# Patient Record
Sex: FEMALE | Race: WHITE | Employment: OTHER | URBAN - METROPOLITAN AREA
[De-identification: names, ages, dates, MRNs, and addresses within clinical notes are randomized per-mention and may not be internally consistent; named-entity substitution may affect disease eponyms.]

---

## 2018-08-04 ENCOUNTER — APPOINTMENT (OUTPATIENT)
Dept: CT IMAGING | Age: 58
End: 2018-08-04
Payer: COMMERCIAL

## 2018-08-04 ENCOUNTER — HOSPITAL ENCOUNTER (EMERGENCY)
Age: 58
Discharge: HOME | End: 2018-08-04
Attending: EMERGENCY MEDICINE
Payer: COMMERCIAL

## 2018-08-04 VITALS
DIASTOLIC BLOOD PRESSURE: 82 MMHG | BODY MASS INDEX: 21.66 KG/M2 | WEIGHT: 138 LBS | TEMPERATURE: 98.1 F | HEART RATE: 57 BPM | OXYGEN SATURATION: 100 % | SYSTOLIC BLOOD PRESSURE: 140 MMHG | RESPIRATION RATE: 12 BRPM | HEIGHT: 67 IN

## 2018-08-04 DIAGNOSIS — S32.010A CLOSED COMPRESSION FRACTURE OF FIRST LUMBAR VERTEBRA, INITIAL ENCOUNTER: Primary | ICD-10-CM

## 2018-08-04 PROCEDURE — 99283 EMERGENCY DEPT VISIT LOW MDM: CPT

## 2018-08-04 PROCEDURE — 72131 CT LUMBAR SPINE W/O DYE: CPT

## 2018-08-04 RX ORDER — ACETAMINOPHEN AND CODEINE PHOSPHATE 300; 30 MG/1; MG/1
1 TABLET ORAL EVERY 4 HOURS PRN
Qty: 12 TABLET | Refills: 0 | Status: SHIPPED | OUTPATIENT
Start: 2018-08-04 | End: 2018-08-07

## 2018-08-04 ASSESSMENT — ENCOUNTER SYMPTOMS
SORE THROAT: 0
EYE PAIN: 0
COUGH: 0
STRIDOR: 0
CONSTIPATION: 0
EYE REDNESS: 0
NAUSEA: 0
BACK PAIN: 1
DIARRHEA: 0
VOMITING: 0
WHEEZING: 0
SHORTNESS OF BREATH: 0
ABDOMINAL PAIN: 0
COLOR CHANGE: 0
EYE DISCHARGE: 0

## 2018-08-04 ASSESSMENT — PAIN DESCRIPTION - ORIENTATION: ORIENTATION: LOWER

## 2018-08-04 ASSESSMENT — PAIN SCALES - GENERAL: PAINLEVEL_OUTOF10: 5

## 2018-08-04 ASSESSMENT — PAIN DESCRIPTION - LOCATION: LOCATION: BACK

## 2018-08-04 NOTE — ED PROVIDER NOTES
[facility] ED  [address] [address]  Phone: [phone]  Fax: [phone]        Pt Name: All Caballero  MRN: [id number]  [place] 1960  Date of evaluation: 8/4/18      CHIEF COMPLAINT       Chief Complaint   Patient presents with    Back Pain         HISTORY OF PRESENT ILLNESS  (Location/Symptom, Timing/Onset, Context/Setting, Quality, Duration, Modifying Factors, Severity.)    All Caballero is a 60 y.o. female who presents Complaining of lumbar pain. She relates that about 1 week ago she fell in her garage. She relates that she is not really even sure what exactly she was doing her why she fell but her foot came out from under her and slipped causing her to twist into the cabinet. She tried to grab the cabinet to keep from falling but ended up landing on the left side and relates it didn't feel quite right even when she landed but she was able to get out so she thought that everything would be fine. She relates she was doing fairly well and was even doing her yoga with minimal pain. However she relates that last night when she was coughing and sneezing she relates the pain became out of control. She denies any numbness or tingling of the extremities. She's had no problems with bowel or bladder habits. She relates when she has a bowel movement it does hurt and feels like a pressure in the low back. REVIEW OF SYSTEMS    (2-9 systems for level 4, 10 or more for level 5)     Review of Systems   Constitutional: Negative for activity change, appetite change, chills, fatigue and fever. HENT: Negative for congestion, ear pain and sore throat. Eyes: Negative for pain, discharge and redness. Respiratory: Negative for cough, shortness of breath, wheezing and stridor. Cardiovascular: Negative for chest pain. Gastrointestinal: Negative for abdominal pain, constipation, diarrhea, nausea and vomiting.    Genitourinary: Negative for decreased urine volume tenderness. Neurological: She is alert and oriented to person, place, and time. She exhibits normal muscle tone. Coordination normal.   Skin: Skin is warm. No rash noted. She is not diaphoretic. Psychiatric: She has a normal mood and affect. Her behavior is normal.       DIFFERENTIAL DIAGNOSIS/ MDM:     I did discuss with her that we will await CT imaging and go from there. There is concern that there was an abnormality on the x-ray at the urgent care she is comfortable with this plan of care verbalizes understanding. I have answered any questions has at this time. DIAGNOSTIC RESULTS     EKG: All EKG's are interpreted by the Emergency Department Physician who either signs or Co-signs this chart in the absence of a cardiologist.    None    RADIOLOGY:   Non-plain film images such as CT, Ultrasound and MRI are read by the radiologist. Gordy Gray radiographic images are visualized and the radiologist interpretations are reviewed as follows:     Interpretation per the Radiologist below, if available at the time of this note:    Ct Lumbar Spine Wo Contrast    Result Date: 8/4/2018  EXAMINATION: CT OF THE LUMBAR SPINE WITHOUT CONTRAST  8/4/2018 TECHNIQUE: CT of the lumbar spine was performed without the administration of intravenous contrast. Multiplanar reformatted images are provided for review. Dose modulation, iterative reconstruction, and/or weight based adjustment of the mA/kV was utilized to reduce the radiation dose to as low as reasonably achievable. COMPARISON: None HISTORY: ORDERING SYSTEM PROVIDED HISTORY: Pain/trauma TECHNOLOGIST PROVIDED HISTORY: Reason for exam:->Pain/trauma Ordering Physician Provided Reason for Exam: midline low back pain Acuity: Acute Type of Exam: Initial Mechanism of Injury: fall two weeks ago FINDINGS: BONES/ALIGNMENT: Diffuse osteopenia limits evaluation. Mild acute L1 compression fracture is seen with less than 25% loss of height.   No significant retropulsion of fracture

## 2018-08-04 NOTE — ED TRIAGE NOTES
Pt to exam room without difficulty with c/o low back pain. Pt was sent from urgent care. Pt fell about a week ago. Pt denies pain radiation.  Pt alert and oriented upon arrival.

## 2022-10-10 ENCOUNTER — HOSPITAL ENCOUNTER (OUTPATIENT)
Dept: CT IMAGING | Age: 62
Discharge: HOME | End: 2022-10-12
Payer: COMMERCIAL

## 2022-10-10 ENCOUNTER — OFFICE VISIT (OUTPATIENT)
Dept: ORTHOPEDIC SURGERY | Age: 62
End: 2022-10-10
Payer: COMMERCIAL

## 2022-10-10 VITALS — OXYGEN SATURATION: 100 % | RESPIRATION RATE: 16 BRPM | BODY MASS INDEX: 21.66 KG/M2 | WEIGHT: 138 LBS | HEIGHT: 67 IN

## 2022-10-10 DIAGNOSIS — S82.899A FRACTURE OF ANKLE, CLOSED, UNSPECIFIED LATERALITY, INITIAL ENCOUNTER: ICD-10-CM

## 2022-10-10 DIAGNOSIS — M25.572 LEFT ANKLE PAIN, UNSPECIFIED CHRONICITY: Primary | ICD-10-CM

## 2022-10-10 DIAGNOSIS — S82.899A FRACTURE OF ANKLE, CLOSED, UNSPECIFIED LATERALITY, INITIAL ENCOUNTER: Primary | ICD-10-CM

## 2022-10-10 PROCEDURE — 99204 OFFICE O/P NEW MOD 45 MIN: CPT | Performed by: ORTHOPAEDIC SURGERY

## 2022-10-10 PROCEDURE — 73700 CT LOWER EXTREMITY W/O DYE: CPT

## 2022-10-10 RX ORDER — ASPIRIN 325 MG
325 TABLET, DELAYED RELEASE (ENTERIC COATED) ORAL DAILY
Qty: 42 TABLET | Refills: 0 | Status: SHIPPED | OUTPATIENT
Start: 2022-10-10 | End: 2022-11-21

## 2022-10-10 NOTE — PROGRESS NOTES
[address] [facility] ORTHOPEDICS AND SPORTS MEDICINE  [address] [facility]  [address] [facility] [address]  Dept: [phone]    Ambulatory Orthopedic Consult      CHIEF COMPLAINT:    Chief Complaint   Patient presents with    Ankle Pain     Left        HISTORY OF PRESENT ILLNESS:      The patient is a 60 y.o. female who is being seen for evaluation of the above, which began around 10/4/2022 secondary to a fall from standing height  . At today's visit, she is using a splint/cast.     History is obtained today from:   [x]  the patient     [x]  EMR     []  one family member/friend    []  multiple family members/friends    []  other:          REVIEW OF SYSTEMS:  Musculoskeletal: See HPI for pertinent positives     Past Medical History:    She  has no past medical history on file. Past Surgical History:    She  has a past surgical history that includes Bunionectomy. Current Medications:   No current outpatient medications on file. Allergies:    Patient has no known allergies. Family History:  family history is not on file. Social History:   Social History     Occupational History    Not on file   Tobacco Use    Smoking status: Never    Smokeless tobacco: Never   Substance and Sexual Activity    Alcohol use: No     Alcohol/week: 0.0 standard drinks    Drug use: No    Sexual activity: Yes     Partners: Male     Retired    OBJECTIVE:  Resp 16   Ht 5' 7\" (1.702 m)   Wt 138 lb (62.6 kg)   SpO2 100%   BMI 21.61 kg/m²    Psych: awake, alert  Cardio:  well perfused extremities, no cyanosis  Resp:  normal respiratory effort  Musculoskeletal:    Affected lower extremity:    Vascular: Limb well perfused, compartments soft/compressible. Skin: No erythema/ulcers. Intact.    Neurovascular Status:  Grossly neurovascularly intact throughout  Motion:  Grossly able to fire major muscle groups with appropriate/expected AROM  Tenderness to Palpation: Ankle diffusely  -Negative squeeze test for syndesmotic injury      RADIOLOGY:   10/10/2022 FINDINGS:  Three weightbearing views (AP, Mortise, and Lateral) of the left ankle and three weightbearing views (AP, Oblique, Lateral) of the left foot were obtained in the office today and reviewed, revealing nondisplaced distal fibula fracture, as well as radiolucency through the medial malleolus likely representing nondisplaced fracture line. IMPRESSION: Osseous injury as above. Electronically signed by Loni Morrow MD      Relevant previous imaging reviewed, both imaging and report(s) as below:    No results found. ASSESSMENT AND PLAN:  Body mass index is 21.61 kg/m². She has a left ankle fracture (nondisplaced distal fibula and medial malleolus fractures), sustained around 10/4/2022. Notably, she has the past medical history as above. She has a history of left leg venous insufficiency, and tobacco use (reports that she smokes 3 cigarettes/day). We had a discussion today about the likely diagnosis and its natural history, physical exam and imaging findings, as well as various treatment options in detail. Surgically, we discussed a possible left ankle ORIF, depending on her clinical course and imaging. We discussed both surgical and nonsurgical treatment options, and I did explain that I believe nonsurgical management is very reasonable, given her lack of significant displacement, however, I did recommend a CT scan prior to making a definitive surgical decision. We discussed the risks of displacement/reinjury. Orders/referrals were placed as below at today's visit. The patient was placed into an Ace wrap and a CAM boot. We discussed that the patient will remain nonweightbearing. The patient was ordered DME to help them maintain the weightbearing restriction.  I also ordered physical therapy for the patient to hep reinforce/teach the relevant weight bearing precautions, to help allow for safe transfers and early mobilization, as well as effectively utilize DME. At today's visit, she was ordered a walker and a rolling knee scooter; we discussed that she may obtain these as needed. We also had a discussion about the risk of blood clot and thromboembolic events. The patient understands that there is an increased risk with surgery/immobilization, and understands nothing will completely eliminate the risk of DVT/PE's, and that any prophylactic medication does not substitute for early mobilization. Given her risk profile, I have recommended the following strategy to decrease the risk of blood clots, and the patient agrees and wishes to proceed:  Aspirin 325 mg PO q Day. In order to know exactly how to proceed with treatment, a CT was ordered today to evaluate the ankle fracture. This is medically necessary to evaluate the exact bony alignment/architecture. All questions were answered and the above plan was agreed upon. The patient will return to clinic after the CT has been obtained without x-rays. At her next visit, we will review her CT scan, and likely continue conservative management, versus possibly considering surgery. At the patient's next visit, depending on how the patient is doing and/or new imaging/labs results, we may consider the following options:    []  Lace up ankle     []  CAM boot         []  removable wrist brace     []  PT:        []  Wean out immobilization         []  Adv activity      []  Footmind        []  Spenco       []  Custom Orthotic:               []  AZ brace                    []  Rocker Bottom      []  Night splint    []  Heel cups        []  Strap        []  Toe gizmos    []  Topl        []  NSAIDs         []  Angel        []  Ref:         []  Stress Xray    []  CT        []  MRI  []  Inj:          []  Consider OR      []  Pick OR date    No follow-ups on file. No orders of the defined types were placed in this encounter.     No orders of the defined types were placed in this encounter. Michelle Drake MD  Orthopedic Surgery        Please excuse any typos/errors, as this note was created with the assistance of voice recognition software. While intending to generate a document that actually reflects the content of the visit, the document can still have some errors including those of syntax and sound-a-like substitutions which may escape proof reading. In such instances, actual meaning can be extrapolated by context.

## 2022-10-11 ENCOUNTER — TELEPHONE (OUTPATIENT)
Dept: ORTHOPEDIC SURGERY | Age: 62
End: 2022-10-11

## 2022-10-11 NOTE — TELEPHONE ENCOUNTER
[address] called in requesting notes as to why the patient needs the knee scooter. Please advise thank you.  Fax number [phone]

## 2022-10-14 ENCOUNTER — HOSPITAL ENCOUNTER (OUTPATIENT)
Dept: PHYSICAL THERAPY | Facility: CLINIC | Age: 62
Setting detail: THERAPIES SERIES
Discharge: HOME | End: 2022-10-14
Payer: COMMERCIAL

## 2022-10-14 PROCEDURE — 97116 GAIT TRAINING THERAPY: CPT

## 2022-10-14 PROCEDURE — 97161 PT EVAL LOW COMPLEX 20 MIN: CPT

## 2022-10-14 NOTE — CONSULTS
[] [facility]  [address]    [phone]  F: [phone]   [] [address]  [facility]   [address]  P: [phone]  F: [phone]  [x] [address] [facility]  [address]  P: [phone]  F: [phone]   [] [email or web address]  P: [phone]  F: [phone]  [] [facility]  [address]  [place]: [phone]   F: [phone] [] [facility]  Out[facility] &  Therapy  [facility] [address]   [place]: [phone]  F: [phone]       Physical Therapy Evaluation    Date:  10/14/2022   Patient: [place] Kuo  : 1960  MRN: [id number]  Physician: Vanesa Arthur MD    Insurance: [employer], Based on med nec;   Medical Diagnosis: L97.363S (ICD-10-CM) - Fracture of ankle, closed, unspecified laterality, initial encounter   Rehab Codes: R26.2  Onset date: 10/4/22   Next 's appt.: 10/17/22    Subjective:   CC/HPI: Patient reports slipping in the shower while on vacation and broke R ankle. Reports no pain    PMHx: None reported    Tests: [x] X-Ray:10/10/2022 FINDINGS:  Three weightbearing views (AP, Mortise, and Lateral) of the left ankle and three weightbearing views (AP, Oblique, Lateral) of the left foot were obtained in the office today and reviewed, revealing nondisplaced distal fibula fracture, as well as radiolucency through the medial malleolus likely representing nondisplaced fracture line. IMPRESSION: Osseous injury as above.     [] MRI:    [] Other:     Medications:  [x] Refer to full medical record [] None [] Other:  Allergies:       [x] Refer to full medical record [] None [] Other:      Martial Status  spouse works out of state   Home type [employer] apt moving out Oct 31st to a   [employer] from outside 2 step bilateral HR    Stairs inside [employer] in  and and tub shower   Throw rugs No   Pets No   Employement Retired   Job status    Work Activities/duties     Recreational Activities        Objective:    Special tests:  Educated pt on use of DME, including: (Check box of device used)     [x] Knee Scooter: Instructed pt on appropriate height being even with contralateral knee. Reviewed safety concerns such as not gliding on turns and using breaks appropriately. [x] Rolling Walker: Instructed pt on appropriate height of even with wrists with arms at resting at side. Educated pt on safe gait pattern while using walker. Explained to pt the difference between a 4 wheeled walker and rolling walker and how a rolling walker is most appropriate for a NWB pt. [x] Axillary Crutches: Instructed pt on appropriate height of two finger width between crutch and axilla, as well as elbow flexion of approximately 20deg. Educated pt on how to adjust both crutch and handle height. [x] Stairs: up and down 3 steps with axillary crutches    Pt with good understanding of all techniques/uses and expressed no safety concerns. At this time pt will most benefit from use of: Rolling knee scooter and crutches      Comments: Patient demonstrates IND and safe ambulation using bilateral axillary crutches and Rolling knee scooter and maintaining NWB protocol. Assessment:  STG: (to be met in 1 treatments)  Educate patient on proper use of DME   Patient to perform transfers and weight bearing status independent without assistance   ? Strength: Independent with Home Exercise Programs  Demonstrate Knowledge of fall prevention                   Patient goals:  To stay IND    Rehab Potential:  [x] Good  [] Fair  [] Poor   Suggested Professional Referral:  [x] No  [] Yes:  Barriers to Goal Achievement[de-identified]  [x] No  [] Yes:  Domestic Concerns:  [x] No  [] Yes: Pt. Education:  [x] Plans/Goals, Risks/Benefits discussed  [] Home exercise program    Method of Education: [] Verbal  [] Demo  [] Written  Comprehension of Education:  [] Verbalizes understanding. [] Demonstrates understanding. [] Needs Review. [] Demonstrates/verbalizes understanding of HEP/Ed previously given. Treatment Plan:  [] Therapeutic Exercise      [] Manual Therapy       [] Instruction in HEP        [] Neuromuscular Re-education     [] Vasocompression Winifred Harada)        [x] Gait Training                      Frequency:  1 visits    Todays Treatment:    Exercises:  Exercise     Reps/ Time Weight/ Level Comments   Gait training    Axillary crutches, RW knee scooter   Stair training                                                Other:    Evaluation Complexity:  History (Personal factors, comorbidities) [x] 0 [] 1-2 [] 3+   Exam (limitations, restrictions) [x] 1-2 [] 3 [] 4+   Clinical presentation (progression) [x] Stable [] Evolving  [] Unstable   Decision Making [x] Low [] Moderate [] High    [x] Low Complexity [] Moderate Complexity [] High Complexity         [x]  Patient was able to demonstrate compliance with the relevant weight bearing restriction, and safe discharge to home is anticipated after surgery. []  Patient was unable to demonstrate compliance with the relevant weight bearing restriction, and further sessions with PT are unlikely to help this; discharge to a SNF is anticipated after surgery.       The following pieces of DME are mandatory for safe discharge to home:    []   rolling walker (2-wheel)      [x]   crutches      [x]   rolling knee scooter      []   wheelchair      []   other: ________________      The following pieces of DME are recommended as helpful but are optional:    [x]   rolling walker (2-wheel)      []   crutches      []   rolling knee scooter      []   wheelchair       []   other: ________________        Treatment Charges: Mins Units   [x] Evaluation [x]  Low       []  Moderate       []  High 15 1   []  Modalities     []  Ther Exercise     []  Manual Therapy     []  Ther Activities     []  Aquatics     []  Vasocompression     [x]  Gait 15 1     TOTAL TREATMENT TIME: 30    Time in:11am   Time Out:  1145am    Electronically signed by: Mamta Vega PT        Physician Signature:________________________________Date:__________________  By signing above or cosigning this note, I have reviewed this plan of care and certify a need for medically necessary rehabilitation services.      *PLEASE SIGN ABOVE AND FAX BACK ALL PAGES*

## 2022-10-17 ENCOUNTER — OFFICE VISIT (OUTPATIENT)
Dept: ORTHOPEDIC SURGERY | Age: 62
End: 2022-10-17
Payer: COMMERCIAL

## 2022-10-17 VITALS — WEIGHT: 138 LBS | BODY MASS INDEX: 21.66 KG/M2 | HEIGHT: 67 IN | RESPIRATION RATE: 16 BRPM | OXYGEN SATURATION: 100 %

## 2022-10-17 DIAGNOSIS — S82.899D FRACTURE OF ANKLE, CLOSED, UNSPECIFIED LATERALITY, WITH ROUTINE HEALING, SUBSEQUENT ENCOUNTER: Primary | ICD-10-CM

## 2022-10-17 PROCEDURE — 99213 OFFICE O/P EST LOW 20 MIN: CPT | Performed by: ORTHOPAEDIC SURGERY

## 2022-10-17 NOTE — PROGRESS NOTES
[address] [facility] ORTHOPEDICS AND SPORTS MEDICINE  [address] [facility]  [address] [facility] [address]  Dept: [phone]    Ambulatory Orthopedic Consult      CHIEF COMPLAINT:    Chief Complaint   Patient presents with    Ankle Pain     Left          HISTORY OF PRESENT ILLNESS:      The patient is a 60 y.o. female who is being seen for evaluation of the above, which began around 10/4/2022 secondary to a fall from standing height  . At today's visit, she is using a splint/cast.     History is obtained today from:   [x]  the patient     [x]  EMR     []  one family member/friend    []  multiple family members/friends    []  other:      INTERVAL HISTORY 10/17/2022:  She is seen again today in the office for follow up of imaging as below. Since being seen last, the patient is doing about the same overall. At today's visit, she is using a brace/boot and crutches. History is obtained today from:   [x]  the patient     [x]  EMR     []  one family member/friend    []  multiple family members/friends    []  other:        REVIEW OF SYSTEMS:  Musculoskeletal: See HPI for pertinent positives     Past Medical History:    She  has no past medical history on file. Past Surgical History:    She  has a past surgical history that includes Bunionectomy. Current Medications:     Current Outpatient Medications:     aspirin 325 MG EC tablet, Take 1 tablet by mouth daily, Disp: 42 tablet, Rfl: 0     Allergies:    Patient has no known allergies. Family History:  family history is not on file.     Social History:   Social History     Occupational History    Not on file   Tobacco Use    Smoking status: Never    Smokeless tobacco: Never   Substance and Sexual Activity    Alcohol use: No     Alcohol/week: 0.0 standard drinks    Drug use: No    Sexual activity: Yes     Partners: Male     Retired    OBJECTIVE:  Resp 16   Ht 5' 7\" (1.702 m)   Wt 138 lb (62.6 kg) have recommended conservative management. We discussed the risks of displacement/reinjury. Orders/referrals were placed as below at today's visit. She will continue to avoid pain provoking activity, and we have discussed the risks of displacement/reinjury. She may weight-bear as tolerated in the cam boot. She may take the boot off at night to sleep, as well as multiple times throughout the day for range of motion. For DVT prophylaxis, we have discussed that the patient will use the following:  Aspirin 325 mg PO q Day. All questions were answered and the above plan was agreed upon. The patient will return to clinic in 4 weeks with repeat left ankle x-rays. At her next visit, I anticipate progressing her activity, and likely weaning her out of the boot. At the patient's next visit, depending on how the patient is doing and/or new imaging/labs results, we may consider the following options:    []  Lace up ankle     []  CAM boot         []  removable wrist brace     []  PT:        []  Wean out immobilization         []  Adv activity      []  Footmind        []  Spenco       []  Custom Orthotic:               []  AZ brace                    []  Rocker Bottom      []  Night splint    []  Heel cups        []  Strap        []  Toe gizmos    []  Topl        []  NSAIDs         []  Angel        []  Ref:         []  Stress Xray    []  CT        []  MRI  []  Inj:          []  Consider OR      []  Pick OR date    No follow-ups on file. No orders of the defined types were placed in this encounter. No orders of the defined types were placed in this encounter. Teresa Jackman MD  Orthopedic Surgery        Please excuse any typos/errors, as this note was created with the assistance of voice recognition software.  While intending to generate a document that actually reflects the content of the visit, the document can still have some errors including those of syntax and sound-a-like substitutions which may escape proof reading. In such instances, actual meaning can be extrapolated by context.

## 2022-11-11 DIAGNOSIS — S82.899D FRACTURE OF ANKLE, CLOSED, UNSPECIFIED LATERALITY, WITH ROUTINE HEALING, SUBSEQUENT ENCOUNTER: Primary | ICD-10-CM

## 2022-11-14 ENCOUNTER — OFFICE VISIT (OUTPATIENT)
Dept: ORTHOPEDIC SURGERY | Age: 62
End: 2022-11-14
Payer: COMMERCIAL

## 2022-11-14 VITALS — OXYGEN SATURATION: 100 % | HEIGHT: 67 IN | BODY MASS INDEX: 21.66 KG/M2 | WEIGHT: 138 LBS | RESPIRATION RATE: 16 BRPM

## 2022-11-14 DIAGNOSIS — S82.899D FRACTURE OF ANKLE, CLOSED, UNSPECIFIED LATERALITY, WITH ROUTINE HEALING, SUBSEQUENT ENCOUNTER: Primary | ICD-10-CM

## 2022-11-14 PROCEDURE — 99212 OFFICE O/P EST SF 10 MIN: CPT | Performed by: ORTHOPAEDIC SURGERY

## 2022-11-14 NOTE — PROGRESS NOTES
[facility] ORTHOPEDICS AND SPORTS MEDICINE  [address] [facility]  [address] [facility] [address]  Dept: [phone]    Ambulatory Orthopedic Consult      CHIEF COMPLAINT:    Chief Complaint   Patient presents with    Ankle Pain     Left          HISTORY OF PRESENT ILLNESS:      The patient is a 60 y.o. female who is being seen for evaluation of the above, which began around 10/4/2022 secondary to a fall from standing height  . At today's visit, she is using a splint/cast.     History is obtained today from:   [x]  the patient     [x]  EMR     []  one family member/friend    []  multiple family members/friends    []  other:      INTERVAL HISTORY 10/17/2022:  She is seen again today in the office for follow up of imaging as below. Since being seen last, the patient is doing about the same overall. At today's visit, she is using a brace/boot and crutches. History is obtained today from:   [x]  the patient     [x]  EMR     []  one family member/friend    []  multiple family members/friends    []  other:      INTERVAL HISTORY 11/14/2022:  She is seen again today in the office for follow up of a previous issue (as above). Since being seen last, the patient is doing better. At today's visit, she is using a removable brace and crutches. History is obtained today from:   [x]  the patient     []  EMR     []  one family member/friend    []  multiple family members/friends    []  other:      She reports that she has no pain when walking in the cam boot. REVIEW OF SYSTEMS:  Musculoskeletal: See HPI for pertinent positives     Past Medical History:    She  has no past medical history on file. Past Surgical History:    She  has a past surgical history that includes Bunionectomy.      Current Medications:     Current Outpatient Medications:     aspirin 325 MG EC tablet, Take 1 tablet by mouth daily, Disp: 42 tablet, Rfl: 0     Allergies: Patient has no known allergies. Family History:  family history is not on file. Social History:   Social History     Occupational History    Not on file   Tobacco Use    Smoking status: Never    Smokeless tobacco: Never   Substance and Sexual Activity    Alcohol use: No     Alcohol/week: 0.0 standard drinks    Drug use: No    Sexual activity: Yes     Partners: Male     Retired    OBJECTIVE:  Resp 16   Ht 5' 7\" (1.702 m)   Wt 138 lb (62.6 kg)   SpO2 100%   BMI 21.61 kg/m²    Psych: awake, alert  Cardio:  well perfused extremities, no cyanosis  Resp:  normal respiratory effort  Musculoskeletal:    Affected lower extremity:    Vascular: Limb well perfused, compartments soft/compressible. Skin: No erythema/ulcers. Intact. Neurovascular Status:  Grossly neurovascularly intact throughout  Motion:  Grossly able to fire major muscle groups with appropriate/expected AROM  Tenderness to Palpation: Ankle diffusely--improved/minimal  -Negative squeeze test for syndesmotic injury      RADIOLOGY:   11/14/2022 FINDINGS:  Three weightbearing views (AP, Mortise, and Lateral) of the left ankle were obtained in the office today and reviewed, revealing nondisplaced distal fibula fracture. Interval healing without interval displacement. IMPRESSION: Osseous injury as above. Electronically signed by Neal Prater MD        CT images and radiology report reviewed, as below:    1. Acute and minimally displaced fracture of the left lateral malleolus. 2. Osteopenia. 3. soft tissue edema of the left ankle and extending into the left foot. FINDINGS:  Three weightbearing views (AP, Mortise, and Lateral) of the left ankle and three weightbearing views (AP, Oblique, Lateral) of the left foot were obtained in the office today and reviewed, revealing nondisplaced distal fibula fracture, as well as radiolucency through the medial malleolus likely representing nondisplaced fracture line.      IMPRESSION: Osseous injury as above. Electronically signed by Neal Prater MD      Relevant previous imaging reviewed, both imaging and report(s) as below:    No results found. ASSESSMENT AND PLAN:  Body mass index is 21.61 kg/m². She has a left ankle fracture (nondisplaced distal fibula fracture), sustained around 10/4/2022. She is healing well with conservative management. Notably, she has the past medical history as above. She has a history of left leg venous insufficiency, and tobacco use (reports that she smokes 3 cigarettes/day). We had a discussion today about the likely diagnosis and its natural history, physical exam and imaging findings, as well as various treatment options in detail. Surgically, we again discussed that no surgical invention is indicated at this time, and I have recommended conservative management. We have discussed the risks of displacement/reinjury. Orders/referrals were placed as below at today's visit. She may continue to advance her activity as tolerated. She may continue weight bearing as tolerated. The patient may wean out of the cam boot, and into a lace up ankle brace, which was provided today. Once the patient is completely out of the CAM boot, the patient may stop taking DVT prophylaxis as previously prescribed. All questions were answered and the above plan was agreed upon. The patient will return to clinic in 6 weeks as needed with left ankle x-rays. In the future, we may consider physical therapy.            At the patient's next visit, depending on how the patient is doing and/or new imaging/labs results, we may consider the following options:    []  Lace up ankle     []  CAM boot         []  removable wrist brace     []  PT:        []  Wean out immobilization         []  Adv activity      []  Footmind        []  Spenco       []  Custom Orthotic:               []  AZ brace                    []  Rocker Bottom      []  Night splint    []  Heel cups []  Strap        []  Toe gizmos    []  Topl        []  NSAIDs         []  Angel        []  Ref:         []  Stress Xray    []  CT        []  MRI  []  Inj:          []  Consider OR      []  Pick OR date    No follow-ups on file. No orders of the defined types were placed in this encounter. No orders of the defined types were placed in this encounter. Jaden Rucker MD  Orthopedic Surgery        Please excuse any typos/errors, as this note was created with the assistance of voice recognition software. While intending to generate a document that actually reflects the content of the visit, the document can still have some errors including those of syntax and sound-a-like substitutions which may escape proof reading. In such instances, actual meaning can be extrapolated by context.

## 2023-04-06 DIAGNOSIS — S82.899D FRACTURE OF ANKLE, CLOSED, UNSPECIFIED LATERALITY, WITH ROUTINE HEALING, SUBSEQUENT ENCOUNTER: Primary | ICD-10-CM
